# Patient Record
Sex: MALE | Race: WHITE | NOT HISPANIC OR LATINO | Employment: STUDENT | URBAN - METROPOLITAN AREA
[De-identification: names, ages, dates, MRNs, and addresses within clinical notes are randomized per-mention and may not be internally consistent; named-entity substitution may affect disease eponyms.]

---
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
## 2023-01-25 NOTE — PROGRESS NOTES
Neurotology Clinic      Name: Dawit Field  MRN: [id number]  Age: 17 year old  : 2005  Referring provider: Referred Self  2023      Chief Complaint:   Consultation     History of Present Illness:   Dawit Field is a 17 year old male with no significant PMHx who presents for consultation regarding sudden left hearing loss and tinnitus. Consultation was requested by Selene Camejo. This sudden loss first started to be appreciated around 2021 but he deferred evaluation for some time. He was then seen by Selene on 10/4/22 with repeat audiogram which continued to demonstrate left SNHL. He then underwent MRI on 10/14/22 which returned unremarkable.     Today he says that he lost his hearing gradually over a few days. He was seen in urgent care, where they thought he had an effusion. His hearing loss was associated with tinnitus. He denies having had aural fullness or vertigo at the time of the episode. He continues to have persistant, constant, non-pulsatile tinnitus. He still feels he cannot understand much hearing from his left ear. He endorses good sound localization with no hyperacusis. Denies any vertigo. Denies a history of hearing loss in the family. Denies otorrhea or otalgia. He did have one episode six months ago which included a pimple like fluid collection in his left conchal bowl which spontaneously resolved.      Review of Systems:   Pertinent items are noted in HPI or as in patient entered ROS below, remainder of complete ROS is negative.    ENT ROS 2023   Ears, Nose, Throat: Hearing loss, Ringing/noise in ears        Active Medications:   - None   No current outpatient medications on file.      Allergies:   Patient has no known allergies.      Past Medical History:  Past Medical History:   Diagnosis Date     Tinnitus 2021      Past Surgical History:  No past surgical history on file.    Family History:   No family history on file.      Social History:  "  Social History     Tobacco Use     Smoking status: Never     Smokeless tobacco: Never   Substance Use Topics     Alcohol use: Yes     Drug use: Never        Physical Exam:   /76   Pulse 80   Temp 97.3  F (36.3  C)   Ht 1.727 m (5' 8\")   Wt 58.5 kg (129 lb)   SpO2 99%   BMI 19.61 kg/m     Constitutional:  The patient was unaccompanied, well-groomed, and in no acute distress.     Skin: Normal:  warm and pink without rash   Neurologic: Alert and oriented x 3.  CN's III-XII within normal limits.  Voice normal.    Psychiatric: The patient's affect was calm, cooperative, and appropriate.     Communication:  Normal; communicates verbally, normal voice quality.   Respiratory: Breathing comfortably without stridor or exertion of accessory muscles.    Eyes: Pupils were equal and reactive.  Extraocular movement intact.     Ears: Pinnae and tragus non-tender.  EAC's and TM's were clear.      Right ear: EAC with some cerumen which was gently debrided. TMs were within normal limits.   Left ear: EAC with some cerumen which was gently debrided. TMs were within normal limits.      Audiogram:  AUDIOGRAM: He underwent an audiogram today. This demonstrated: Right normal hearing and left mild sensorineural hearing loss at low frequencies downsloping to severe sensorineural hearing loss at higher frequencies.        Right: Speech reception threshold is 10 dB with 100% word recognition. Tympanogram A type   Left: Speech reception threshold is 25 dB with 12% and 20% word recognition. Tympanogram A type     Audiogram was independently reviewed    Imaging:  MRI 10/14/22    I personally reviewed the MRI. There is no evidence of retrocochlear pathology such as vestibular schwannoma.     IMPRESSION: Unremarkable brain MRI. No abnormality of the 7th or 8th  cranial nerves intracranially.     Assessment and Plan:   1. Left sudden sensorineural hearing loss   2. Left sensorineural hearing loss, AAO-HNS classification D     Dawit " Robert Field is a 17 year old male with prior sudden left SNHL and with residual left sensorineural hearing loss with low word recognition. His left hearing is severely impaired at higher frequencies, with poor word recognition. However he still has some good pure tone thresholds in lower frequencies. We discussed the natural history of sudden sensorineural hearing loss and that his hearing is unlikely to return. We discussed options for treatment/management, which focus around hearing aids, CROS, airpods with iphone, versus cochlear implantation. We do not favor cochlear implantation at this time given his low frequency hearing is still intact and this could potentially be compromised but discussed the pending technologies.    - Return to clinic in November with audiogram  - Patient will reach out to us with any additional hearing concerns for prompt assessment    Blaise Gordillo MD  Otolaryngology - Head and Neck Surgery PGY-2    I, Larissa Toro MD, saw this patient with the resident/fellow and agree with the resident's findings and plan of care as documented in the resident's/fellow's note.

## 2023-01-27 ENCOUNTER — OFFICE VISIT (OUTPATIENT)
Dept: OTOLARYNGOLOGY | Facility: CLINIC | Age: 18
End: 2023-01-27
Payer: COMMERCIAL

## 2023-01-27 ENCOUNTER — PRE VISIT (OUTPATIENT)
Dept: OTOLARYNGOLOGY | Facility: CLINIC | Age: 18
End: 2023-01-27

## 2023-01-27 VITALS
WEIGHT: 129 LBS | BODY MASS INDEX: 19.55 KG/M2 | SYSTOLIC BLOOD PRESSURE: 113 MMHG | DIASTOLIC BLOOD PRESSURE: 76 MMHG | OXYGEN SATURATION: 99 % | HEART RATE: 80 BPM | TEMPERATURE: 97.3 F | HEIGHT: 68 IN

## 2023-01-27 DIAGNOSIS — H90.3 ASYMMETRICAL SENSORINEURAL HEARING LOSS: ICD-10-CM

## 2023-01-27 DIAGNOSIS — H90.42 SENSORINEURAL HEARING LOSS (SNHL) OF LEFT EAR WITH UNRESTRICTED HEARING OF RIGHT EAR: Primary | ICD-10-CM

## 2023-01-27 PROCEDURE — 99203 OFFICE O/P NEW LOW 30 MIN: CPT | Mod: GC | Performed by: OTOLARYNGOLOGY

## 2023-01-27 ASSESSMENT — PAIN SCALES - GENERAL: PAINLEVEL: NO PAIN (0)

## 2023-01-27 NOTE — LETTER
2023       RE: Dawit Field  [address] [place] [id number]     Dear Colleague,    Thank you for referring your patient, Dawit Field, to the [facility] [place] at [facility]. Please see a copy of my visit note below.      Neurotology Clinic      Name: Dawit Field  MRN: [id number]  Age: 17 year old  : 2005  Referring provider: Referred Self  2023      Chief Complaint:   Consultation     History of Present Illness:   Dawit Field is a 17 year old male with no significant PMHx who presents for consultation regarding sudden left hearing loss and tinnitus. Consultation was requested by Selene Camejo. This sudden loss first started to be appreciated around 2021 but he deferred evaluation for some time. He was then seen by Selene on 10/4/22 with repeat audiogram which continued to demonstrate left SNHL. He then underwent MRI on 10/14/22 which returned unremarkable.     Today he says that he lost his hearing gradually over a few days. He was seen in urgent care, where they thought he had an effusion. His hearing loss was associated with tinnitus. He denies having had aural fullness or vertigo at the time of the episode. He continues to have persistant, constant, non-pulsatile tinnitus. He still feels he cannot understand much hearing from his left ear. He endorses good sound localization with no hyperacusis. Denies any vertigo. Denies a history of hearing loss in the family. Denies otorrhea or otalgia. He did have one episode six months ago which included a pimple like fluid collection in his left conchal bowl which spontaneously resolved.      Review of Systems:   Pertinent items are noted in HPI or as in patient entered ROS below, remainder of complete ROS is negative.    ENT ROS 2023   Ears, Nose, Throat: Hearing loss, Ringing/noise in ears        Active Medications:   -  "None   No current outpatient medications on file.      Allergies:   Patient has no known allergies.      Past Medical History:  Past Medical History:   Diagnosis Date     Tinnitus November 2021      Past Surgical History:  No past surgical history on file.    Family History:   No family history on file.      Social History:   Social History     Tobacco Use     Smoking status: Never     Smokeless tobacco: Never   Substance Use Topics     Alcohol use: Yes     Drug use: Never        Physical Exam:   /76   Pulse 80   Temp 97.3  F (36.3  C)   Ht 1.727 m (5' 8\")   Wt 58.5 kg (129 lb)   SpO2 99%   BMI 19.61 kg/m     Constitutional:  The patient was unaccompanied, well-groomed, and in no acute distress.     Skin: Normal:  warm and pink without rash   Neurologic: Alert and oriented x 3.  CN's III-XII within normal limits.  Voice normal.    Psychiatric: The patient's affect was calm, cooperative, and appropriate.     Communication:  Normal; communicates verbally, normal voice quality.   Respiratory: Breathing comfortably without stridor or exertion of accessory muscles.    Eyes: Pupils were equal and reactive.  Extraocular movement intact.     Ears: Pinnae and tragus non-tender.  EAC's and TM's were clear.      Right ear: EAC with some cerumen which was gently debrided. TMs were within normal limits.   Left ear: EAC with some cerumen which was gently debrided. TMs were within normal limits.      Audiogram:  AUDIOGRAM: He underwent an audiogram today. This demonstrated: Right normal hearing and left mild sensorineural hearing loss at low frequencies downsloping to severe sensorineural hearing loss at higher frequencies.        Right: Speech reception threshold is 10 dB with 100% word recognition. Tympanogram A type   Left: Speech reception threshold is 25 dB with 12% and 20% word recognition. Tympanogram A type     Audiogram was independently reviewed    Imaging:  MRI 10/14/22    I personally reviewed the MRI. " There is no evidence of retrocochlear pathology such as vestibular schwannoma.     IMPRESSION: Unremarkable brain MRI. No abnormality of the 7th or 8th  cranial nerves intracranially.     Assessment and Plan:   1. Left sudden sensorineural hearing loss   2. Left sensorineural hearing loss, AAO-HNS classification D     Dawit Field is a 17 year old male with prior sudden left SNHL and with residual left sensorineural hearing loss with low word recognition. His left hearing is severely impaired at higher frequencies, with poor word recognition. However he still has some good pure tone thresholds in lower frequencies. We discussed the natural history of sudden sensorineural hearing loss and that his hearing is unlikely to return. We discussed options for treatment/management, which focus around hearing aids, CROS, airpods with iphone, versus cochlear implantation. We do not favor cochlear implantation at this time given his low frequency hearing is still intact and this could potentially be compromised but discussed the pending technologies.    - Return to clinic in November with audiogram  - Patient will reach out to us with any additional hearing concerns for prompt assessment    Blaise Gordillo MD  Otolaryngology - Head and Neck Surgery PGY-2    I, Larissa Toro MD, saw this patient with the resident/fellow and agree with the resident's findings and plan of care as documented in the resident's/fellow's note.      Again, thank you for allowing me to participate in the care of your patient.      Sincerely,    Larissa Toro MD

## 2023-01-27 NOTE — LETTER
Date:January 30, 2023      Patient was self referred, no letter generated. Do not send.        [facility] Health Information

## 2023-01-27 NOTE — NURSING NOTE
"Chief Complaint   Patient presents with     Consult     Sudden hearing loss      Blood pressure 113/76, pulse 80, temperature 97.3  F (36.3  C), height 1.727 m (5' 8\"), weight 58.5 kg (129 lb), SpO2 99 %.    Leno Diaz LPN    "

## 2023-01-27 NOTE — PATIENT INSTRUCTIONS
You were seen in the ENT Clinic today by Dr. Toro. If you have any questions or concerns after your appointment, please contact us (see below)      2.   Please return to clinic in November with an audiogram prior.         How to Contact Us:  Send a Petnet message to your provider. Our team will respond to you via Petnet. Occasionally, we will need to call you to get further information.  For urgent matters (Monday-Friday), call the ENT Clinic: [phone] and speak with a call center team member - they will route your call appropriately.   If you'd like to speak directly with a nurse, please find our contact information below. We do our best to check voicemail frequently throughout the day, and will work to call you back within 1-2 days. For urgent matters, please use the general clinic phone numbers listed above.      Dara PRINGLE RN  ENT RN Care Coordinator  Direct: [phone]    Cortney MURILLO LPN  Direct: [phone]

## 2023-02-08 ENCOUNTER — OFFICE VISIT (OUTPATIENT)
Dept: AUDIOLOGY | Facility: CLINIC | Age: 18
End: 2023-02-08
Payer: COMMERCIAL

## 2023-02-08 DIAGNOSIS — H90.42 SENSORINEURAL HEARING LOSS (SNHL) OF LEFT EAR WITH UNRESTRICTED HEARING OF RIGHT EAR: Primary | ICD-10-CM

## 2023-02-08 PROCEDURE — 92590 PR HEARING AID EXAM MONAURAL: CPT | Performed by: AUDIOLOGIST

## 2023-02-08 NOTE — PROGRESS NOTES
AUDIOLOGY REPORT    SUBJECTIVE: Dawit Field is a 17 year old male who was seen in the Audiology Clinic at the [facility] for a hearing aid consult. The patient has been seen previously in this clinic on 10/28/2022 for assessment and results indicated normal hearing sensitivity with 100% word recognition score in the right ear and borderline normal sloping to moderately-severe rising to moderate sensorineural hearing loss in the left ear with 20% word recognition score. The patient notes difficulty with communication in a variety of listening situations, such as localization. Dawit was medically evaluated by Dr. Larissa Toro and determined to be cleared for a CROS system.    OBJECTIVE: Patient is a hearing aid candidate. Patient would like to move forward with a hearing aid evaluation today. Therefore, the patient was presented with different options for amplification to help aid in communication. Discussed differences between a traditional hearing aid, a CROS system, and a Cochlear implant. Reviewed with patient that ENT note states they would prefer to try a non-surgical option due to the good low frequency hearing that he has. Patient noted he is not interested in a cochlear implant at this time. Discussed, the hearing aid CROS (Contralateral Routing Of Signals) system options, as well as a traditional hearing aid. For a CROS, the the  in the ear (RITE) microphone transmits the sound from the hearing impaired side and wirelessly transmits it to the hearing aid so the patient can hear what is presented to the poorer side. Discussed styles, levels of technology and monaural vs. binaural fitting. Discussed disposable vs rechargeable batteries and connectivity.     The patient reports he is unsure if he wants to wear something in his normal hearing ear at this time. He notes that he may just want to try a left sided device to help with localization.  Reviewed expectations for a left side traditional hearing aid given his word recognition score. Patient expressed understanding and agreement. Briefly discussed that if cochlear implant is his goal, there can be some benefit for keeping the ear aided.     At this time, patient is unsure if he would like to move forward with amplification. He would like to think about it further prior to ordering a device. Patient was also given a copy of his insurance coverage information and informed of payment plan options.     The hearing aid(s) mutually chosen should patient decide to pursue amplification:  Either the Oticon More 3 for the right ear only or the Oticon More 2 CROS system.   COLOR: 90  BATTERY SIZE: rechargeable  CANAL/ LENGTH: 2      ASSESSMENT: Reviewed purchase information and warranty information with patient. The 45 day trial period was explained to patient. The patient was given a copy of the [place] [employer] consumer brochure on purchasing hearing instruments. Patient risk factors have been provided to the patient in writing prior to the sale of the hearing aid per FDA regulation. The risk factors are also available in the User Instructional Booklet to be presented on the day of the hearing aid fitting. Hearing aid(s) were not ordered. Hearing aid evaluation completed.    PLAN:  Patient was counseled regarding hearing loss and impact on communication. Patient will call the clinic once he has decided if he is wanting to pursue amplification or not. Should patient decide to move forward with amplification, hearing aid fitting and initial check will need to be scheduled. Please call this clinic with questions regarding these results or recommendations.      Nathaly BARRAZA.   Audiology Doctoral Student   [place] #[id number]     I was present with the patient for the entire Audiology appointment including all procedures/testing performed by the AuD student, and agree with the assessment and plan  as documented.    Nasim Padron CCC-A  Licensed Audiologist   [place] #[id number]

## 2023-04-23 NOTE — TELEPHONE ENCOUNTER
Called to see if he had tinnitus back in April when he had his hearing test and it is stable or has it changed since then. CB number given. May need to get a hearing test before his appt with Selene.     Cortney Cifuentes LPN    
Pt had the symptom when he had his audiogram in April and the symptom is stable No need for a repeat audio per Selene CUEVAS. Pt agreeable to the plan.    Cortney Cifuentes LPN    
Unknown

## 2023-06-04 ENCOUNTER — HEALTH MAINTENANCE LETTER (OUTPATIENT)
Age: 18
End: 2023-06-04

## 2023-10-31 NOTE — PROGRESS NOTES
"  Neurotology Clinic      Name: Dawit Field  MRN: [id number]  Age: 18 year old  : 2025     Chief Complaint:   Follow up      History of Present Illness:   Dawit Field is a 18 year old male with no significant PMHx who presents for consultation regarding sudden left hearing loss and tinnitus. Consultation was requested by Selene Camejo. This sudden loss first started to be appreciated around 2021 but he deferred evaluation for some time. He was then seen by Selene on 10/4/22 with repeat audiogram which continued to demonstrate left SNHL. He then underwent MRI on 10/14/22 which returned unremarkable. He was last seen by me on 2023 and we discussed options for treatment/management, which focus around hearing aids, CROS, airpods with iphone, versus cochlear implantation.     Today he reports he feels about the same. He doesn't really feel different over this past year. He put himself in situations where he uses his right ear more. He had an informational meeting about obtaining CROS hearing aids, but he will hold off on wearing hearing aids at this time.     Review of Systems:   Pertinent items are noted in HPI or as in patient entered ROS below, remainder of complete ROS is negative.       2023     1:06 PM    ENT ROS   Ears, Nose, Throat Hearing loss    Ringing/noise in ears         Physical Exam:   /84 (BP Location: Right arm, Patient Position: Sitting, Cuff Size: Adult Regular)   Pulse 94   Ht 1.727 m (5' 8\")   Wt 57.6 kg (127 lb)   SpO2 98%   BMI 19.31 kg/m       Constitutional:  The patient was unaccompanied, well-groomed, and in no acute distress.     Skin: Normal:  warm and pink without rash   Neurologic: Alert and oriented x 3.  CN's III-XII within normal limits.  Voice normal.    Psychiatric: The patient's affect was calm, cooperative, and appropriate.     Communication:  Normal; communicates verbally, normal voice quality.   Respiratory: " Breathing comfortably without stridor or exertion of accessory muscles.    Eyes: Pupils were equal and reactive.  Extraocular movement intact.       Ears: Pinnae and tragus non-tender.  EAC's and TM's were clear.      Right ear: EAC normal. TMs were within normal limits. Middle ear aerated.  Left ear: EAC normal. TMs were within normal limits.  Middle ear aerated.    Audiogram:  AUDIOGRAM: He underwent an audiogram today. This demonstrated:  Hearing is WNL in his right ear. His left ear shows a mild sloping to severe rising to moderate SNHL. This is stable compared to last year's audiogram.    Right: Speech reception threshold is 10 dB with 100% word recognition. Tympanogram A type   Left: Speech reception threshold is 15 dB with 24% word recognition. Tympanogram A type     Audiogram was independently reviewed     Assessment and Plan:      Dawit Field is a 18 year old male with prior sudden left SNHL and with residual left sensorineural hearing loss with poor word recognition. His left hearing is severely impaired at higher frequencies, with poor word recognition. However he still has some good pure tone thresholds in lower frequencies. His audiogram today is stable compared to last year's audiogram. He has chosen to old off on obtaining CROS hearing aids at this time. We answered his questions around the cochlear implant and the process of implantation. Given no significant change in his hearing, we suggest patient continues to monitor his hearing with an audiogram for the next 3-5 years. Patient may follow-up as needed should there be any changes.      Scribe Disclosure:   I, Iesha Thomas, am serving as a scribe; to document services personally performed by Larissa Toro MD -based on data collection and the provider's statements to me.     Provider Disclosure:  I agree with above History, Review of Systems, Physical exam and Plan.  I have reviewed the content of the documentation and have edited it as  needed. I have personally performed the services documented here and the documentation accurately represents those services and the decisions I have made.      Larissa Toro MD  Otology & Neurotology  [facility]

## 2023-11-03 ENCOUNTER — OFFICE VISIT (OUTPATIENT)
Dept: OTOLARYNGOLOGY | Facility: CLINIC | Age: 18
End: 2023-11-03
Attending: OTOLARYNGOLOGY
Payer: COMMERCIAL

## 2023-11-03 ENCOUNTER — OFFICE VISIT (OUTPATIENT)
Dept: AUDIOLOGY | Facility: CLINIC | Age: 18
End: 2023-11-03
Attending: OTOLARYNGOLOGY
Payer: COMMERCIAL

## 2023-11-03 VITALS
WEIGHT: 127 LBS | OXYGEN SATURATION: 98 % | SYSTOLIC BLOOD PRESSURE: 121 MMHG | HEART RATE: 94 BPM | BODY MASS INDEX: 19.25 KG/M2 | HEIGHT: 68 IN | DIASTOLIC BLOOD PRESSURE: 84 MMHG

## 2023-11-03 DIAGNOSIS — H90.3 ASYMMETRICAL SENSORINEURAL HEARING LOSS: ICD-10-CM

## 2023-11-03 DIAGNOSIS — H90.42 SENSORINEURAL HEARING LOSS (SNHL) OF LEFT EAR WITH UNRESTRICTED HEARING OF RIGHT EAR: ICD-10-CM

## 2023-11-03 DIAGNOSIS — H90.42 SENSORINEURAL HEARING LOSS (SNHL) OF LEFT EAR WITH UNRESTRICTED HEARING OF RIGHT EAR: Primary | ICD-10-CM

## 2023-11-03 PROCEDURE — 92557 COMPREHENSIVE HEARING TEST: CPT | Performed by: AUDIOLOGIST

## 2023-11-03 PROCEDURE — 99213 OFFICE O/P EST LOW 20 MIN: CPT | Performed by: OTOLARYNGOLOGY

## 2023-11-03 PROCEDURE — 92550 TYMPANOMETRY & REFLEX THRESH: CPT | Performed by: AUDIOLOGIST

## 2023-11-03 ASSESSMENT — PAIN SCALES - GENERAL: PAINLEVEL: NO PAIN (0)

## 2023-11-03 NOTE — LETTER
"11/3/2023       RE: Dawit Field  [address] [place] [id number]     Dear Colleague,    Thank you for referring your patient, Dawit Field, to the [facility] [place] [facility]. Please see a copy of my visit note below.      Neurotology Clinic      Name: Dawit Field  MRN: [id number]  Age: 18 year old  : 2025     Chief Complaint:   Follow up      History of Present Illness:   Dawit Field is a 18 year old male with no significant PMHx who presents for consultation regarding sudden left hearing loss and tinnitus. Consultation was requested by Selene Camejo. This sudden loss first started to be appreciated around 2021 but he deferred evaluation for some time. He was then seen by Selene on 10/4/22 with repeat audiogram which continued to demonstrate left SNHL. He then underwent MRI on 10/14/22 which returned unremarkable. He was last seen by me on 2023 and we discussed options for treatment/management, which focus around hearing aids, CROS, airpods with iphone, versus cochlear implantation.     Today he reports he feels about the same. He doesn't really feel different over this past year. He put himself in situations where he uses his right ear more. He had an informational meeting about obtaining CROS hearing aids, but he will hold off on wearing hearing aids at this time.     Review of Systems:   Pertinent items are noted in HPI or as in patient entered ROS below, remainder of complete ROS is negative.       2023     1:06 PM    ENT ROS   Ears, Nose, Throat Hearing loss    Ringing/noise in ears         Physical Exam:   /84 (BP Location: Right arm, Patient Position: Sitting, Cuff Size: Adult Regular)   Pulse 94   Ht 1.727 m (5' 8\")   Wt 57.6 kg (127 lb)   SpO2 98%   BMI 19.31 kg/m       Constitutional:  The patient was unaccompanied, well-groomed, " and in no acute distress.     Skin: Normal:  warm and pink without rash   Neurologic: Alert and oriented x 3.  CN's III-XII within normal limits.  Voice normal.    Psychiatric: The patient's affect was calm, cooperative, and appropriate.     Communication:  Normal; communicates verbally, normal voice quality.   Respiratory: Breathing comfortably without stridor or exertion of accessory muscles.    Eyes: Pupils were equal and reactive.  Extraocular movement intact.       Ears: Pinnae and tragus non-tender.  EAC's and TM's were clear.      Right ear: EAC normal. TMs were within normal limits. Middle ear aerated.  Left ear: EAC normal. TMs were within normal limits.  Middle ear aerated.    Audiogram:  AUDIOGRAM: He underwent an audiogram today. This demonstrated:  Hearing is WNL in his right ear. His left ear shows a mild sloping to severe rising to moderate SNHL. This is stable compared to last year's audiogram.    Right: Speech reception threshold is 10 dB with 100% word recognition. Tympanogram A type   Left: Speech reception threshold is 15 dB with 24% word recognition. Tympanogram A type     Audiogram was independently reviewed     Assessment and Plan:      Dawit Field is a 18 year old male with prior sudden left SNHL and with residual left sensorineural hearing loss with poor word recognition. His left hearing is severely impaired at higher frequencies, with poor word recognition. However he still has some good pure tone thresholds in lower frequencies. His audiogram today is stable compared to last year's audiogram. He has chosen to old off on obtaining CROS hearing aids at this time. We answered his questions around the cochlear implant and the process of implantation. Given no significant change in his hearing, we suggest patient continues to monitor his hearing with an audiogram for the next 3-5 years. Patient may follow-up as needed should there be any changes.      Scribe Disclosure:   Iesha MCMAHON  William, am serving as a scribe; to document services personally performed by Larissa Toro MD -based on data collection and the provider's statements to me.     Provider Disclosure:  I agree with above History, Review of Systems, Physical exam and Plan.  I have reviewed the content of the documentation and have edited it as needed. I have personally performed the services documented here and the documentation accurately represents those services and the decisions I have made.      Larissa Toro MD  Otology & Neurotology  [facility]             Again, thank you for allowing me to participate in the care of your patient.      Sincerely,    Larissa Toro MD

## 2023-11-03 NOTE — PATIENT INSTRUCTIONS
You were seen in the ENT Clinic today by Dr. Toro. If you have any questions or concerns after your appointment, please contact us (see below)      2.   Please return to clinic as needed.        How to Contact Us:  Send a ZummZumm message to your provider. Our team will respond to you via ZummZumm. Occasionally, we will need to call you to get further information.  For urgent matters (Monday-Friday), call the ENT Clinic: [phone] and speak with a call center team member - they will route your call appropriately.   If you'd like to speak directly with a nurse, please find our contact information below. We do our best to check voicemail frequently throughout the day, and will work to call you back within 1-2 days. For urgent matters, please use the general clinic phone numbers listed above.      Dara PRINGLE RN  ENT RN Care Coordinator  Direct: [phone]    Cortney MURILLO LPN  Direct: [phone]

## 2023-11-03 NOTE — PROGRESS NOTES
AUDIOLOGY REPORT    SUMMARY: Audiology visit completed. See audiogram for results.      RECOMMENDATIONS: Follow-up with ENT.        Nasim Bain, CCC-A  Licensed Audiologist  [place] #[id number]

## 2024-07-28 ENCOUNTER — HEALTH MAINTENANCE LETTER (OUTPATIENT)
Age: 19
End: 2024-07-28

## 2025-08-10 ENCOUNTER — HEALTH MAINTENANCE LETTER (OUTPATIENT)
Age: 20
End: 2025-08-10